# Patient Record
Sex: MALE | URBAN - METROPOLITAN AREA
[De-identification: names, ages, dates, MRNs, and addresses within clinical notes are randomized per-mention and may not be internally consistent; named-entity substitution may affect disease eponyms.]

---

## 2017-09-25 ENCOUNTER — APPOINTMENT (OUTPATIENT)
Age: 52
Setting detail: DERMATOLOGY
End: 2017-09-25

## 2017-09-25 DIAGNOSIS — L81.4 OTHER MELANIN HYPERPIGMENTATION: ICD-10-CM

## 2017-09-25 DIAGNOSIS — L82.0 INFLAMED SEBORRHEIC KERATOSIS: ICD-10-CM

## 2017-09-25 DIAGNOSIS — L57.0 ACTINIC KERATOSIS: ICD-10-CM

## 2017-09-25 PROCEDURE — 17110 DESTRUCTION B9 LES UP TO 14: CPT

## 2017-09-25 PROCEDURE — ? LIQUID NITROGEN

## 2017-09-25 PROCEDURE — ? COUNSELING

## 2017-09-25 PROCEDURE — 99202 OFFICE O/P NEW SF 15 MIN: CPT | Mod: 25

## 2017-09-25 PROCEDURE — 17000 DESTRUCT PREMALG LESION: CPT | Mod: 59

## 2017-09-25 ASSESSMENT — LOCATION SIMPLE DESCRIPTION DERM
LOCATION SIMPLE: RIGHT CHEEK
LOCATION SIMPLE: LEFT CHEEK
LOCATION SIMPLE: LEFT FOREHEAD

## 2017-09-25 ASSESSMENT — LOCATION DETAILED DESCRIPTION DERM
LOCATION DETAILED: LEFT CENTRAL MALAR CHEEK
LOCATION DETAILED: LEFT MEDIAL FOREHEAD
LOCATION DETAILED: RIGHT CENTRAL MALAR CHEEK
LOCATION DETAILED: LEFT SUPERIOR LATERAL FOREHEAD

## 2017-09-25 ASSESSMENT — LOCATION ZONE DERM
LOCATION ZONE: FACE
LOCATION ZONE: FACE

## 2017-09-25 NOTE — PROCEDURE: LIQUID NITROGEN
Detail Level: Detailed
Post-Care Instructions: I reviewed with the patient in detail post-care instructions. Patient is to wear sunprotection, and avoid picking at any of the treated lesions. Pt may apply Vaseline to crusted or scabbing areas.
Add 52 Modifier (Optional): no
Consent: The patient's consent was obtained including but not limited to risks of crusting, scabbing, blistering, scarring, darker or lighter pigmentary change, recurrence, incomplete removal and infection.
Duration Of Freeze Thaw-Cycle (Seconds): 0
Medical Necessity Clause: This procedure was medically necessary because the lesions that were treated were:
Medical Necessity Information: It is in your best interest to select a reason for this procedure from the list below. All of these items fulfill various CMS LCD requirements except the new and changing color options.

## 2018-12-07 ENCOUNTER — NEW PATIENT (OUTPATIENT)
Age: 53
End: 2018-12-07

## 2018-12-07 DIAGNOSIS — H40.013: ICD-10-CM

## 2018-12-07 DIAGNOSIS — H52.203: ICD-10-CM

## 2018-12-07 DIAGNOSIS — H52.13: ICD-10-CM

## 2018-12-07 DIAGNOSIS — H52.4: ICD-10-CM

## 2018-12-07 PROCEDURE — 92015 DETERMINE REFRACTIVE STATE: CPT

## 2018-12-07 PROCEDURE — 92004 COMPRE OPH EXAM NEW PT 1/>: CPT

## 2018-12-07 ASSESSMENT — KERATOMETRY
OD_K1POWER_DIOPTERS: 44.50
OS_AXISANGLE2_DEGREES: 080
OD_AXISANGLE2_DEGREES: 156
OD_K2POWER_DIOPTERS: 45.00
OS_K1POWER_DIOPTERS: 45.00
OS_AXISANGLE_DEGREES: 170
OD_AXISANGLE_DEGREES: 66
OS_K2POWER_DIOPTERS: 45.25

## 2018-12-07 ASSESSMENT — VISUAL ACUITY
OD_CC: 20/20
OU_CC: J1+
OS_CC: 20/20

## 2018-12-07 ASSESSMENT — TONOMETRY
OS_IOP_MMHG: 21
OD_IOP_MMHG: 19

## 2018-12-14 ENCOUNTER — FOLLOW UP (OUTPATIENT)
Age: 53
End: 2018-12-14

## 2018-12-14 DIAGNOSIS — H40.013: ICD-10-CM

## 2018-12-14 PROCEDURE — 92083 EXTENDED VISUAL FIELD XM: CPT

## 2018-12-14 PROCEDURE — 92133 CPTRZD OPH DX IMG PST SGM ON: CPT

## 2018-12-14 PROCEDURE — 76514 ECHO EXAM OF EYE THICKNESS: CPT

## 2018-12-14 PROCEDURE — 92020 GONIOSCOPY: CPT

## 2018-12-14 PROCEDURE — 99213 OFFICE O/P EST LOW 20 MIN: CPT

## 2018-12-14 ASSESSMENT — PACHYMETRY
OS_CT_UM: 512
OD_CT_UM: 507

## 2018-12-14 ASSESSMENT — KERATOMETRY
OS_AXISANGLE_DEGREES: 170
OS_K2POWER_DIOPTERS: 45.25
OD_K1POWER_DIOPTERS: 44.50
OD_AXISANGLE_DEGREES: 66
OD_K2POWER_DIOPTERS: 45.00
OS_AXISANGLE2_DEGREES: 080
OD_AXISANGLE2_DEGREES: 156
OS_K1POWER_DIOPTERS: 45.00

## 2018-12-14 ASSESSMENT — TONOMETRY
OD_IOP_MMHG: 18
OS_IOP_MMHG: 21

## 2018-12-14 ASSESSMENT — VISUAL ACUITY
OS_CC: 20/20
OD_CC: 20/20

## 2019-10-07 ENCOUNTER — APPOINTMENT (RX ONLY)
Dept: URBAN - METROPOLITAN AREA CLINIC 151 | Facility: CLINIC | Age: 54
Setting detail: DERMATOLOGY
End: 2019-10-07

## 2019-10-07 DIAGNOSIS — L08.1 ERYTHRASMA: ICD-10-CM

## 2019-10-07 DIAGNOSIS — L57.0 ACTINIC KERATOSIS: ICD-10-CM

## 2019-10-07 DIAGNOSIS — L72.8 OTHER FOLLICULAR CYSTS OF THE SKIN AND SUBCUTANEOUS TISSUE: ICD-10-CM

## 2019-10-07 DIAGNOSIS — H01.13 ECZEMATOUS DERMATITIS OF EYELID: ICD-10-CM

## 2019-10-07 PROBLEM — L08.9 LOCAL INFECTION OF THE SKIN AND SUBCUTANEOUS TISSUE, UNSPECIFIED: Status: ACTIVE | Noted: 2019-10-07

## 2019-10-07 PROBLEM — H01.139 ECZEMATOUS DERMATITIS OF UNSPECIFIED EYE, UNSPECIFIED EYELID: Status: ACTIVE | Noted: 2019-10-07

## 2019-10-07 PROCEDURE — ? DIAGNOSIS COMMENT

## 2019-10-07 PROCEDURE — ? PRESCRIPTION

## 2019-10-07 PROCEDURE — 99203 OFFICE O/P NEW LOW 30 MIN: CPT

## 2019-10-07 RX ORDER — GENTAMICIN SULFATE 1 MG/G
CREAM TOPICAL
Qty: 1 | Refills: 6 | COMMUNITY
Start: 2019-10-07

## 2019-10-07 RX ORDER — PHARMACY COMPOUNDING ACCESSORY
EACH MISCELLANEOUS
Qty: 30 | Refills: 5 | Status: ERX | COMMUNITY
Start: 2019-10-07

## 2019-10-07 RX ORDER — TACROLIMUS 1 MG/G
OINTMENT TOPICAL
Qty: 1 | Refills: 2 | COMMUNITY
Start: 2019-10-07

## 2019-10-07 RX ORDER — FLUOROURACIL 50 MG/G
CREAM TOPICAL
Qty: 1 | Refills: 2 | COMMUNITY
Start: 2019-10-07

## 2019-10-07 RX ADMIN — TACROLIMUS: 1 OINTMENT TOPICAL at 14:28

## 2019-10-07 RX ADMIN — GENTAMICIN SULFATE: 1 CREAM TOPICAL at 14:30

## 2019-10-07 RX ADMIN — FLUOROURACIL: 50 CREAM TOPICAL at 14:25

## 2019-10-07 RX ADMIN — Medication: at 14:31

## 2019-10-07 ASSESSMENT — LOCATION DETAILED DESCRIPTION DERM
LOCATION DETAILED: LEFT MEDIAL MALAR CHEEK
LOCATION DETAILED: LEFT ANTERIOR SCROTUM

## 2019-10-07 ASSESSMENT — LOCATION ZONE DERM
LOCATION ZONE: GENITALIA
LOCATION ZONE: FACE

## 2019-10-07 ASSESSMENT — LOCATION SIMPLE DESCRIPTION DERM
LOCATION SIMPLE: LEFT CHEEK
LOCATION SIMPLE: SCROTUM

## 2019-10-07 NOTE — PROCEDURE: MIPS QUALITY
Quality 474: Zoster Vaccination Status: Shingrix Vaccination Administered or Previously Received
Quality 110: Preventive Care And Screening: Influenza Immunization: Influenza Immunization Administered during Influenza season
Quality 431: Preventive Care And Screening: Unhealthy Alcohol Use - Screening: Patient screened for unhealthy alcohol use using a single question and scores less than 2 times per year
Detail Level: Detailed
Quality 226: Preventive Care And Screening: Tobacco Use: Screening And Cessation Intervention: Patient screened for tobacco use and is an ex/non-smoker
Quality 131: Pain Assessment And Follow-Up: Pain assessment using a standardized tool is documented as negative, no follow-up plan required
Quality 130: Documentation Of Current Medications In The Medical Record: Current Medications Documented

## 2019-10-07 NOTE — HPI: SKIN LESION
Is This A New Presentation, Or A Follow-Up?: Skin Lesions
Additional History: He presents today with an asymptomatic bump on his scrotum. He also has lesions on right upper eyebrow that was treated as an actinic keratosis by liquid nitrogen but is returning. He reports peeling skin on eyelids and cheeks with some itching. Finally, has has some eyelid “sagging” and wrinkling under eyes that he would like to discuss treatment for.

## 2019-10-07 NOTE — PROCEDURE: DIAGNOSIS COMMENT
Comment: Discussed that treatment with liquid nitrogen would not be as effective for treatment of multiple spots as Efudex would be. Patient understands risks and side effects of treatment.
Detail Level: Zone
Comment: Significant eyelid redundancy.  Discussed options including no treatment versus undergoing upper blepharoplasty to treat eyelid redundancy. For this procedure, I recommended Dr. Yuliya Reed. I recommended Protopic 0.1% ointment daily as needed (PRN) for history of eyelid dermatitis.
Detail Level: Simple

## 2019-12-04 ENCOUNTER — APPOINTMENT (RX ONLY)
Dept: URBAN - METROPOLITAN AREA CLINIC 151 | Facility: CLINIC | Age: 54
Setting detail: DERMATOLOGY
End: 2019-12-04

## 2019-12-04 DIAGNOSIS — L57.0 ACTINIC KERATOSIS: ICD-10-CM

## 2019-12-04 DIAGNOSIS — H01.13 ECZEMATOUS DERMATITIS OF EYELID: ICD-10-CM | Status: IMPROVED

## 2019-12-04 DIAGNOSIS — L08.1 ERYTHRASMA: ICD-10-CM | Status: RESOLVING

## 2019-12-04 PROBLEM — L08.9 LOCAL INFECTION OF THE SKIN AND SUBCUTANEOUS TISSUE, UNSPECIFIED: Status: ACTIVE | Noted: 2019-12-04

## 2019-12-04 PROBLEM — H01.139 ECZEMATOUS DERMATITIS OF UNSPECIFIED EYE, UNSPECIFIED EYELID: Status: ACTIVE | Noted: 2019-12-04

## 2019-12-04 PROCEDURE — ? DIAGNOSIS COMMENT

## 2019-12-04 PROCEDURE — 99213 OFFICE O/P EST LOW 20 MIN: CPT

## 2019-12-04 PROCEDURE — ? PRESCRIPTION MEDICATION MANAGEMENT

## 2019-12-04 PROCEDURE — ? COUNSELING

## 2019-12-04 ASSESSMENT — LOCATION ZONE DERM: LOCATION ZONE: FACE

## 2019-12-04 ASSESSMENT — LOCATION DETAILED DESCRIPTION DERM: LOCATION DETAILED: LEFT INFERIOR MEDIAL MALAR CHEEK

## 2019-12-04 ASSESSMENT — LOCATION SIMPLE DESCRIPTION DERM: LOCATION SIMPLE: LEFT CHEEK

## 2019-12-04 NOTE — PROCEDURE: MIPS QUALITY
Quality 110: Preventive Care And Screening: Influenza Immunization: Influenza Immunization Administered during Influenza season
Quality 226: Preventive Care And Screening: Tobacco Use: Screening And Cessation Intervention: Patient screened for tobacco use and is an ex/non-smoker
Quality 474: Zoster Vaccination Status: Shingrix Vaccination Administered or Previously Received
Detail Level: Detailed
Quality 130: Documentation Of Current Medications In The Medical Record: Current Medications Documented
Quality 431: Preventive Care And Screening: Unhealthy Alcohol Use - Screening: Patient screened for unhealthy alcohol use using a single question and scores less than 2 times per year
Quality 131: Pain Assessment And Follow-Up: Pain assessment using a standardized tool is documented as negative, no follow-up plan required

## 2019-12-04 NOTE — PROCEDURE: DIAGNOSIS COMMENT
Comment: Significant eyelid redundancy.  Discussed options including no treatment versus undergoing upper blepharoplasty to treat eyelid redundancy. For this procedure, I recommended Dr. Yuliya Reed.
Detail Level: Simple

## 2019-12-04 NOTE — PROCEDURE: PRESCRIPTION MEDICATION MANAGEMENT
Render In Strict Bullet Format?: Yes
Continue Regimen: Protopic 0.1% ointment daily as needed to upper eyelids
Detail Level: Zone

## 2020-09-01 ENCOUNTER — ESTABLISHED COMPREHENSIVE EXAM (OUTPATIENT)
Age: 55
End: 2020-09-01

## 2020-09-01 DIAGNOSIS — H52.203: ICD-10-CM

## 2020-09-01 DIAGNOSIS — H52.13: ICD-10-CM

## 2020-09-01 DIAGNOSIS — H40.013: ICD-10-CM

## 2020-09-01 DIAGNOSIS — H52.4: ICD-10-CM

## 2020-09-01 PROCEDURE — 92133 CPTRZD OPH DX IMG PST SGM ON: CPT

## 2020-09-01 PROCEDURE — 92083 EXTENDED VISUAL FIELD XM: CPT

## 2020-09-01 PROCEDURE — 92014 COMPRE OPH EXAM EST PT 1/>: CPT

## 2020-09-01 PROCEDURE — 92015 DETERMINE REFRACTIVE STATE: CPT

## 2020-09-01 ASSESSMENT — VISUAL ACUITY
OS_CC: 20/20
OD_CC: 20/20

## 2020-09-01 ASSESSMENT — KERATOMETRY
OD_K2POWER_DIOPTERS: 45.00
OS_K2POWER_DIOPTERS: 45.25
OS_AXISANGLE_DEGREES: 170
OD_K1POWER_DIOPTERS: 44.50
OS_K1POWER_DIOPTERS: 45.00
OD_AXISANGLE2_DEGREES: 156
OS_AXISANGLE2_DEGREES: 080
OD_AXISANGLE_DEGREES: 66

## 2020-09-01 ASSESSMENT — TONOMETRY
OS_IOP_MMHG: 19
OD_IOP_MMHG: 18